# Patient Record
Sex: FEMALE | Race: WHITE | Employment: FULL TIME | URBAN - METROPOLITAN AREA
[De-identification: names, ages, dates, MRNs, and addresses within clinical notes are randomized per-mention and may not be internally consistent; named-entity substitution may affect disease eponyms.]

---

## 2024-09-30 ENCOUNTER — HOSPITAL ENCOUNTER (OUTPATIENT)
Dept: PHYSICAL THERAPY | Age: 29
Setting detail: THERAPIES SERIES
Discharge: HOME | End: 2024-09-30
Payer: COMMERCIAL

## 2024-09-30 PROCEDURE — 97161 PT EVAL LOW COMPLEX 20 MIN: CPT

## 2024-09-30 PROCEDURE — 97110 THERAPEUTIC EXERCISES: CPT

## 2024-09-30 NOTE — PLAN OF CARE
Activities (31432) including: functional mobility training and education.  Neuromuscular Re-education (87138) activation and proprioception, including postural re-education.    Manual Therapy (77889) as indicated to include: Passive Range of Motion, Gr I-IV mobilizations, Soft Tissue Mobilization, and Dry Needling/IASTM  Modalities as needed that may include: Cryotherapy  Patient education on joint protection, activity modification, and progression of HEP    Plan: POC initiated as per evaluation    Electronically Signed by Neisha Edwards, SACHI  Date: 09/30/2024     Note: Portions of this note have been templated and/or copied from initial evaluation, reassessments and prior notes for documentation efficiency.    Note: If patient does not return for scheduled/recommended follow up visits, this note will serve as a discharge from care along with the most recent update on progress.

## 2024-10-03 ENCOUNTER — HOSPITAL ENCOUNTER (OUTPATIENT)
Dept: PHYSICAL THERAPY | Age: 29
Setting detail: THERAPIES SERIES
Discharge: HOME | End: 2024-10-03
Payer: COMMERCIAL

## 2024-10-03 PROCEDURE — 97140 MANUAL THERAPY 1/> REGIONS: CPT

## 2024-10-03 PROCEDURE — 97110 THERAPEUTIC EXERCISES: CPT

## 2024-10-03 NOTE — FLOWSHEET NOTE
[facility] - Outpatient Rehabilitation and Therapy [address] [place] [address] office: [phone] fax: [phone]         Physical Therapy: TREATMENT/PROGRESS NOTE   Patient: Ayana Espana (29 y.o. female)   Examination Date: 10/03/2024   :  1995 MRN: [id number]   Visit #: 2   Insurance Allowable Auth Needed   [facility], 62/yr []Yes    [x]No    Insurance: Payor: [employer] / Plan: [employer] - CHOICE PLU / Product Type: *No Product type* /   Insurance ID: [id number] - (Commercial)  Secondary Insurance (if applicable):    Treatment Diagnosis: R shoulder pain M25.511, instability of R shoulder M25.311  No diagnosis found.   Medical Diagnosis:  Right shoulder pain [M25.511]   Referring Physician: Unknown, Provider  PCP: No primary care provider on file.     Plan of care signed (Y/N):     Date of Patient follow up with Physician:      Plan of Care Report: EVAL today  POC update due: (10 visits /OR AUTH LIMITS, whichever is less)  2024                                             Medical History:  Comorbidities:  Anxiety  Relevant Medical History: n/a                                         Precautions/ Contra-indications:           Latex allergy:  NO  Pacemaker:    NO  Contraindications for Manipulation: None  Date of Surgery: n/a  Other:    Red Flags:  None    Suicide Screening:   The patient did not verbalize a primary behavioral concern, suicidal ideation, suicidal intent, or demonstrate suicidal behaviors.    Preferred Language for Healthcare:   [x] English       [] other:    SUBJECTIVE EXAMINATION     Patient stated complaint: A little sore after last session, but felt like muscle sore and not pain. Has been working on exercises at home and continues with some soreness with these.        Test used Initial score  9/30/24 10/03/2024   Pain Summary VAS 4-6/10    Functional questionnaire Quick DASH 25% ADLs,  37.5% sport    Other:                  OBJECTIVE EXAMINATION

## 2024-10-07 ENCOUNTER — HOSPITAL ENCOUNTER (OUTPATIENT)
Dept: PHYSICAL THERAPY | Age: 29
Setting detail: THERAPIES SERIES
Discharge: HOME | End: 2024-10-07
Payer: COMMERCIAL

## 2024-10-07 PROCEDURE — 97110 THERAPEUTIC EXERCISES: CPT

## 2024-10-07 PROCEDURE — 97140 MANUAL THERAPY 1/> REGIONS: CPT

## 2024-10-07 NOTE — FLOWSHEET NOTE
[facility] - Outpatient Rehabilitation and Therapy [address] [place] [id number] office: [phone] fax: [phone]         Physical Therapy: TREATMENT/PROGRESS NOTE   Patient: Ayana Espana (29 y.o. female)   Examination Date: 10/07/2024   :  1995 MRN: [id number]   Visit #: 3   Insurance Allowable Auth Needed   [facility], 62/yr []Yes    [x]No    Insurance: Payor: [employer] / Plan: [employer] - CHOICE PLU / Product Type: *No Product type* /   Insurance ID: [id number] - (Commercial)  Secondary Insurance (if applicable):    Treatment Diagnosis: R shoulder pain M25.511, instability of R shoulder M25.311  No diagnosis found.   Medical Diagnosis:  Right shoulder pain [M25.511]   Referring Physician: Unknown, Provider  PCP: No primary care provider on file.     Plan of care signed (Y/N):     Date of Patient follow up with Physician:      Plan of Care Report: EVAL today  POC update due: (10 visits /OR AUTH LIMITS, whichever is less)  2024                                             Medical History:  Comorbidities:  Anxiety  Relevant Medical History: n/a                                         Precautions/ Contra-indications:           Latex allergy:  NO  Pacemaker:    NO  Contraindications for Manipulation: None  Date of Surgery: n/a  Other:    Red Flags:  None    Suicide Screening:   The patient did not verbalize a primary behavioral concern, suicidal ideation, suicidal intent, or demonstrate suicidal behaviors.    Preferred Language for Healthcare:   [x] English       [] other:    SUBJECTIVE EXAMINATION     Patient stated complaint: A little muscle sore in front of shoulder after last session and with the home exercises- but not pain and no popping of shoulder.        Test used Initial score  9/30/24 10/07/2024   Pain Summary VAS 4-6/10    Functional questionnaire Quick DASH 25% ADLs,  37.5% sport    Other:                  OBJECTIVE EXAMINATION     24  ROM/Strength:

## 2024-10-09 ENCOUNTER — HOSPITAL ENCOUNTER (OUTPATIENT)
Dept: PHYSICAL THERAPY | Age: 29
Setting detail: THERAPIES SERIES
Discharge: HOME | End: 2024-10-09
Payer: COMMERCIAL

## 2024-10-09 PROCEDURE — 97140 MANUAL THERAPY 1/> REGIONS: CPT

## 2024-10-09 PROCEDURE — 97112 NEUROMUSCULAR REEDUCATION: CPT

## 2024-10-09 PROCEDURE — 97110 THERAPEUTIC EXERCISES: CPT

## 2024-10-09 NOTE — FLOWSHEET NOTE
Other:                  OBJECTIVE EXAMINATION     9/30/24  ROM/Strength: (Blank cells denote NT)    Mvmt (norm) AROM L AROM R Notes PROM L PROM R Notes       SHOULDER Flexion (180) 168 130 onset of pain, 155 end of motion         Abduction (180) 162 135 with pain at end range        ER -0 80 68 with pain at end range        ER -90 (90) 102 at 90/90 75 at 90/90        IR -0 T3/4 T5/6        IR -90 (70)               MMT L MMT R Notes       SHOULDER Flexion 15.5 8.0 with pain     Abduction 16.7 9.7 with pain     ER -0 19.9 15.5 with pain     ER -90       IR -0 23.2 19.0 with pain     IR -90          Palpation:   Patient reported tenderness with palpation  Location: bicep tendon    Posture:   rounded shoulders    Bandages/Dressings/Incisions:  Not Applicable    Dermatomes: Abnormal findings listed below  None, all WNL    Myotomes: Abnormal findings listed below  None, all WNL    Reflexes: Abnormal findings listed below  All reflexes WNL (2+)    Specific Joint Mobility Testing/Accessory Motions:      Glenohumeral joint: hypomobile on R    Gait:    Pattern: WNL  Assistive Device Used: no AD    Special Tests:  [x] None Assessed   [] Following tests noted:    NT    Balance:  [x] WNL      [] NT       [] Dysfunction noted  Comment:     Falls Risk Assessment (30 days):   Falls Risk assessed and no intervention required.  Time Up and Go (TUG):   Not Assessed        Exercises/Interventions     Therapeutic Ex (60081) 19 resistance Sets/time Reps Notes/Cues/Progressions   UBE  3'     Sleeper stretch  30 4    Standing banded ER green 2 10    Prone scapular retraction + sh extension 1 lb 2 10    Prone T  1 10    Single arm in Quadruped scapular retraction/protraction  0     Over table rows 5 lb 0     Banded extension red 0     Wall clocks red 2 10    No money red 2 10    Retro ER at 90 red 2 10           Manual Intervention (85258) 16  TIME     Gentle inferior and posterior GH mobs  6     STM anterior shoulder and bicep  7